# Patient Record
Sex: MALE | Race: ASIAN | NOT HISPANIC OR LATINO | URBAN - METROPOLITAN AREA
[De-identification: names, ages, dates, MRNs, and addresses within clinical notes are randomized per-mention and may not be internally consistent; named-entity substitution may affect disease eponyms.]

---

## 2023-02-03 ENCOUNTER — VIRTUAL VISIT (OUTPATIENT)
Dept: SLEEP MEDICINE | Facility: CLINIC | Age: 33
End: 2023-02-03
Payer: COMMERCIAL

## 2023-02-03 VITALS — HEIGHT: 62 IN | BODY MASS INDEX: 31.28 KG/M2 | WEIGHT: 170 LBS

## 2023-02-03 DIAGNOSIS — G47.21 CIRCADIAN RHYTHM SLEEP DISORDER, DELAYED SLEEP PHASE TYPE: ICD-10-CM

## 2023-02-03 DIAGNOSIS — E66.811 OBESITY (BMI 30.0-34.9): ICD-10-CM

## 2023-02-03 DIAGNOSIS — R06.81 WITNESSED EPISODE OF APNEA: ICD-10-CM

## 2023-02-03 DIAGNOSIS — R06.83 SNORING: ICD-10-CM

## 2023-02-03 DIAGNOSIS — G47.9 SLEEP DISTURBANCE: Primary | ICD-10-CM

## 2023-02-03 PROCEDURE — 99204 OFFICE O/P NEW MOD 45 MIN: CPT | Mod: 95 | Performed by: INTERNAL MEDICINE

## 2023-02-03 RX ORDER — PERPHENAZINE 4 MG
TABLET ORAL
COMMUNITY
Start: 2022-11-02

## 2023-02-03 RX ORDER — MULTIVITAMIN
1 TABLET ORAL DAILY
COMMUNITY
Start: 2022-11-02

## 2023-02-03 ASSESSMENT — SLEEP AND FATIGUE QUESTIONNAIRES
HOW LIKELY ARE YOU TO NOD OFF OR FALL ASLEEP WHILE SITTING INACTIVE IN A PUBLIC PLACE: MODERATE CHANCE OF DOZING
HOW LIKELY ARE YOU TO NOD OFF OR FALL ASLEEP WHEN YOU ARE A PASSENGER IN A CAR FOR AN HOUR WITHOUT A BREAK: WOULD NEVER DOZE
HOW LIKELY ARE YOU TO NOD OFF OR FALL ASLEEP IN A CAR, WHILE STOPPED FOR A FEW MINUTES IN TRAFFIC: WOULD NEVER DOZE
HOW LIKELY ARE YOU TO NOD OFF OR FALL ASLEEP WHILE LYING DOWN TO REST IN THE AFTERNOON WHEN CIRCUMSTANCES PERMIT: SLIGHT CHANCE OF DOZING
HOW LIKELY ARE YOU TO NOD OFF OR FALL ASLEEP WHILE SITTING QUIETLY AFTER LUNCH WITHOUT ALCOHOL: MODERATE CHANCE OF DOZING
HOW LIKELY ARE YOU TO NOD OFF OR FALL ASLEEP WHILE WATCHING TV: SLIGHT CHANCE OF DOZING
HOW LIKELY ARE YOU TO NOD OFF OR FALL ASLEEP WHILE SITTING AND READING: SLIGHT CHANCE OF DOZING
HOW LIKELY ARE YOU TO NOD OFF OR FALL ASLEEP WHILE SITTING AND TALKING TO SOMEONE: WOULD NEVER DOZE

## 2023-02-03 NOTE — PROGRESS NOTES
Yunior is a 32 year old who is being evaluated via a billable video visit.      How would you like to obtain your AVS? MyChart  If the video visit is dropped, the invitation should be resent by: Send to e-mail at: [email or web address]  Will anyone else be joining your video visit? Nury  Cherelle Main      Video-Visit Details    Type of service:  Video Visit     Originating Location (pt. Location): Home    Distant Location (provider location):  Off-site  Platform used for Video Visit: [facility]       Outpatient Sleep Medicine Consultation:      Name: Yunior Kovacs MRN# [id number]   Age: 32 year old YOB: 1990     Date of Consultation: February 3, 2023  Consultation is requested by: No referring provider defined for this encounter. No ref. provider found  Primary care provider: [facility], [facility]       Reason for Sleep Consult:     Yunior Kovacs is sent by No ref. provider found for a sleep consultation regarding snoring, obtaining evaluation for possible sleep apnea         Assessment and Plan:   Snoring, observed apneas during sleep, in the setting of obesity.  Possible obstructive sleep apnea  Diagnosis is suggested by history of snoring, observed apneas during sleep, neck size> 16 inches and male gender.  Recommended obtaining HST to evaluate for possible sleep apnea and that if HST is negative, we will consider obtaining in- lab sleep study and he was agreeable with the plan.  Obstructive sleep apnea and consequences of untreated sleep apnea were discussed.    We also discussed briefly the treatment options for TASIA.  He is interested in CPAP treatment.    Delayed sleep phase and concern for chronic insufficient sleep.  We discussed regularizing sleep schedule and arrived at a goal bedtime of 11 PM and  a goal wake up time of 7 AM. Recommended minimizing exposure to bright light a couple hours before bedtime and avoiding stimulating activities before bed and avoiding the use of electronics in bed.  "Recommended exposure to bright light for 30 to 60 minutes soon after awakening in the morning using a bright light box of 10,000 Lux intensity.      Obesity:  We discussed weight management with healthy diet, and exercise.    He was instructed to avoid alcohol consumption within 2 to 3 hours before bedtime and to  avoid naps during the day.    Patient was strongly advised to avoid driving, operating any heavy machinery or other hazardous situations while drowsy or sleepy.  Patient was counseled on the importance of driving while alert, to pull over if drowsy, or nap before getting into the vehicle if sleepy.      Plan is to follow up in approximately 2 weeks via video visit to review sleep study results     Summary Counseling:    Sleep Testing Reviewed  Obstructive Sleep Apnea Reviewed  Complications of Untreated Sleep Apnea Reviewed    Medical Decision-making:   Educational materials provided in instructions    CC: No ref. provider found   The above note was dictated using voice recognition software. Although reviewed after completion, some word and grammatical error may remain . Please contact the author for any clarifications.     \" Total time spent was 30 minutes  for this appointment on this date of service which include time spent before, during and after the visit for chart review, patient care, counseling and coordination of care including documentation.\"      Fernando Nelson MD  [facility]   Floor [address], [address]   [address] [place] [address]. [place] [address]   Appointments: [phone]          History of Present Illness:     Yunior Kovacs  Is a 32 year old male who presents to sleep clinic to obtain evaluation for possible sleep apnea.    Past Sleep Evaluations: None    Patient reports loud snoring, observed apneas during sleep, awakenings due to gasping for air and choking.  He also reports dry mouth and occasional morning " headaches.  During workdays, his bedtime is around 12 midnight. It takes 30 minutes for him to fall asleep sometimes it can take longer and he attributes it to active mind.  He uses his phone in bed and goes on the face book.  He wakes up once to use the bathroom during the night and does does not report difficulty falling back asleep after the awakening.  He wakes up with an alarm at 7 AM.  During non-work days, his bedtime is 12 midnight, sometimes later and he wakes up between 9-10 AM.  He reports waking up feeling rested most mornings. He reports occasional fatigue but denies excessive daytime sleepiness. He naps twice a week, for about an hour at the most and feels better after he wakes up from the nap.  He does not drive.  He does not possess a 's license.  He denies symptoms of restless legs syndrome.  He reports occasional sleep talking.  He denies sleepwalking, sleep eating or dream enactment behavior.  He reports rare episodes of sleep paralysis in the past. He denies cataplexy/hallucinations.    SCALES:    EPWORTH SLEEPINESS SCALE      [place] Sleepiness Scale ( HARRIS Davis  7729-3739<br>ESS - [place]/English - Final version - 21 Nov 07 - [facility] [place].) 2/3/2023   Sitting and reading Slight chance of dozing   Watching TV Slight chance of dozing   Sitting, inactive in a public place (e.g. a theatre or a meeting) Moderate chance of dozing   As a passenger in a car for an hour without a break Would never doze   Lying down to rest in the afternoon when circumstances permit Slight chance of dozing   Sitting and talking to someone Would never doze   Sitting quietly after a lunch without alcohol Moderate chance of dozing   In a car, while stopped for a few minutes in traffic Would never doze   [place] Score (MC) 7   [place] Score (Sleep) 7         INSOMNIA SEVERITY INDEX (MIK)      Insomnia Severity Index (MIK) 2/3/2023   Difficulty falling asleep 2   Difficulty staying asleep 2   Problems waking up  "too early 0   How SATISFIED/DISSATISFIED are you with your CURRENT sleep pattern? 2   How NOTICEABLE to others do you think your sleep problem is in terms of impairing the quality of your life? 4   How WORRIED/DISTRESSED are you about your current sleep problem? 4   To what extent do you consider your sleep problem to INTERFERE with your daily functioning (e.g. daytime fatigue, mood, ability to function at work/daily chores, concentration, memory, mood, etc.) CURRENTLY? 4   MIK Total Score 18       Guidelines for Scoring/Interpretation:  Total score categories:  0-7 = No clinically significant insomnia   8-14 = Subthreshold insomnia   15-21 = Clinical insomnia (moderate severity)  22-28 = Clinical insomnia (severe)  Used via courtesy of www.FanLibe[employer].va.gov with permission from Gino vAalos PhD., [facility]      STOP BANG Questionnaire (  2008, the American Society of Anesthesiologists, Inc. [employer] 2/3/2023   BMI Clinic: 31.09     GAD7    No flowsheet data found.    CAGE-AID    No flowsheet data found.    CAGE-AID reprinted with permission from the [place] Medical Journal, MICAELA Eduardo and STEPHANIE Ramos, \"Conjoint screening questionnaires for alcohol and drug abuse\" [place] Medical Journal 94: 135-140, 1995.      PATIENT HEALTH QUESTIONNAIRE-9 (PHQ - 9)    No flowsheet data found.    Developed by Farzana Dubose, Marlene Bhandari, Benjamin Lemons and colleagues, with an educational viv from [employer] No permission required to reproduce, translate, display or distribute.      Allergies:    No Known Allergies    Medications:    Current Outpatient Medications   Medication Sig Dispense Refill     ascorbic acid 1000 MG TABS tablet Take 1,000 mg by mouth       Collagen-Vitamin C-Biotin (COLLAGEN 1500/C) 500-50-0.8 MG CAPS        Multiple Vitamin (ONE-A-DAY ESSENTIAL) TABS Take 1 tablet by mouth daily       omeprazole (PRILOSEC) 20 MG DR capsule Take 20 mg by mouth   " "      Problem List:  There are no problems to display for this patient.       Past Medical/Surgical History:  No past medical history on file.  No past surgical history on file.    Social History:  He drinks 1 cup of coffee in the morning and sometimes 1 can of soda. Very rarely has caffeine within 6 hours before bed. He never smoked cigarettes. He  does not use illicit drugs . He drinks alcohol socially but does not use as  sleep aid and has had alcohol sometimes close to bedtime.    Social History     Socioeconomic History     Marital status: Patient Declined     Spouse name: Not on file     Number of children: Not on file     Years of education: Not on file     Highest education level: Not on file   Occupational History     Not on file   Tobacco Use     Smoking status: Never     Smokeless tobacco: Never   Substance and Sexual Activity     Alcohol use: Yes     Alcohol/week: 1.0 standard drink     Types: 1 Standard drinks or equivalent per week     Drug use: Never     Sexual activity: Not on file   Other Topics Concern     Not on file   Social History Narrative     Not on file     Social Determinants of Health     Financial Resource Strain: Not on file   Food Insecurity: Not on file   Transportation Needs: Not on file   Physical Activity: Not on file   Stress: Not on file   Social Connections: Not on file   Intimate Partner Violence: Not on file   Housing Stability: Not on file       Family History:  Dad snnikita  No family history on file.    Review of Systems:  A complete review of systems reviewed by me is negative with the exeption of what has been mentioned in the history of present illness.  SOB with activity, weight gain    Physical Examination:  Vitals: Ht 1.575 m (5' 2\")   Wt 77.1 kg (170 lb)   BMI 31.09 kg/m    BMI= Body mass index is 31.09 kg/m .    General: No apparent distress, appropriately groomed  Head: Normocephalic, atraumatic  Neck:Circumference: 17 inches  Chest: No cough, no audible wheezing, " able to talk in full sentences  Psych: coherent speech, normal rate and volume, able to articulate logical thoughts, able   to abstract reason, no tangential thoughts, no hallucinations   or delusions  His affect is normal  Neuro:  Mental status: Alert and  Oriented X 3  Speech: normal            Data: All pertinent previous laboratory data reviewed     No results for input(s): NA, POTASSIUM, CHLORIDE, CO2, ANIONGAP, GLC, BUN, CR, CASSANDRA in the last 76273 hours.    No results for input(s): WBC, RBC, HGB, HCT, MCV, MCH, MCHC, RDW, PLT in the last 00813 hours.    No results for input(s): PROTTOTAL, ALBUMIN, BILITOTAL, ALKPHOS, AST, ALT, BILIDIRECT in the last 91872 hours.    No results found for: TSH    No results found for: UAMP, UBARB, BENZODIAZEUR, UCANN, UCOC, OPIT, UPCP    No results found for: IRONSAT, SC39479, LAZARA    No results found for: PH, PHARTERIAL, PO2, [id number], SAT, PCO2, HCO3, BASEEXCESS, CARMELO, BEB      Echocardiology: No results found for this or any previous visit (from the past 4320 hour(s)).    Chest x-ray: No results found for this or any previous visit from the past 365 days.      Chest CT: No results found for this or any previous visit from the past 365 days.      PFT: Most Recent [facility] Pulmonary Function Testing    No results found for: [address]       Fernando Nelson MD 2/3/2023

## 2023-02-05 NOTE — PATIENT INSTRUCTIONS
"      MY TREATMENT INFORMATION FOR SLEEP APNEA-  Yunior Kovacs    DOCTOR : Fernando Nelson MD    Am I having a home sleep study?  --->Watch the video for the device you are using:    -/drop off device-   https://www.[employer].com/watch?v=yGGFBdELGhk    Frequently asked questions:  1. What is Obstructive Sleep Apnea (TASIA)? TASIA is the most common type of sleep apnea. Apnea means, \"without breath.\"  Apnea is most often caused by narrowing or collapse of the upper airway as muscles relax during sleep.   Almost everyone has occasional apneas. Most people with sleep apnea have had brief interruptions at night frequently for many years.  The severity of sleep apnea is related to how frequent and severe the events are.   2. What are the consequences of TASIA? Symptoms include: feeling sleepy during the day, snoring loudly, gasping or stopping of breathing, trouble sleeping, and occasionally morning headaches or heartburn at night.  Sleepiness can be serious and even increase the risk of falling asleep while driving. Other health consequences may include development of high blood pressure and other cardiovascular disease in persons who are susceptible. Untreated TASIA  can contribute to heart disease, stroke and diabetes.   3. What are the treatment options? In most situations, sleep apnea is a lifelong disease that must be managed with daily therapy. Medications are not effective for sleep apnea and surgery is generally not considered until other therapies have been tried. Your treatment is your choice . Continuous Positive Airway (CPAP) works right away and is the therapy that is effective in nearly everyone. An oral device to hold your jaw forward is usually the next most reliable option. Other options include postioning devices (to keep you off your back), weight loss, and surgery including a tongue pacing device. There is more detail about some of these options below.  4. Are my sleep studies " covered by insurance? Although we will request verification of coverage, we advise you also check in advance of the study to ensure there is coverage.    Important tips for those choosing CPAP and similar devices   Know your equipment:  CPAP is continuous positive airway pressure that prevents obstructive sleep apnea by keeping the throat from collapsing while you are sleeping. In most cases, the device is  smart  and can slowly self-adjusts if your throat collapses and keeps a record every day of how well you are treated-this information is available to you and your care team.  BPAP is bilevel positive airway pressure that keeps your throat open and also assists each breath with a pressure boost to maintain adequate breathing.  Special kinds of BPAP are used in patients who have inadequate breathing from lung or heart disease. In most cases, the device is  smart  and can slowly self-adjusts to assist breathing. Like CPAP, the device keeps a record of how well you are treated.  Your mask is your connection to the device. You get to choose what feels most comfortable and the staff will help to make sure if fits. Here: are some examples of the different masks that are available:       Key points to remember on your journey with sleep apnea:  Sleep study.  PAP devices often need to be adjusted during a sleep study to show that they are effective and adjusted right.  Good tips to remember: Try wearing just the mask during a quiet time during the day so your body adapts to wearing it. A humidifier is recommended for comfort in most cases to prevent drying of your nose and throat. Allergy medication from your provider may help you if you are having nasal congestion.  Getting settled-in. It takes more than one night for most of us to get used to wearing a mask. Try wearing just the mask during a quiet time during the day so your body adapts to wearing it. A humidifier is recommended for comfort in most cases. Our team will  work with you carefully on the first day and will be in contact within 4 days and again at 2 and 4 weeks for advice and remote device adjustments. Your therapy is evaluated by the device each day.   Use it every night. The more you are able to sleep naturally for 7-8 hours, the more likely you will have good sleep and to prevent health risks or symptoms from sleep apnea. Even if you use it 4 hours it helps. Occasionally all of us are unable to use a medical therapy, in sleep apnea, it is not dangerous to miss one night.   Communicate. Call our skilled team on the number provided on the first day if your visit for problems that make it difficult to wear the device. Over 2 out of 3 patients can learn to wear the device long-term with help from our team. Remember to call our team or your sleep providers if you are unable to wear the device as we may have other solutions for those who cannot adapt to mask CPAP therapy. It is recommended that you sleep your sleep provider within the first 3 months and yearly after that if you are not having problems.   Use it for your health. We encourage use of CPAP masks during daytime quiet periods to allow your face and brain to adapt to the sensation of CPAP so that it will be a more natural sensation to awaken to at night or during naps. This can be very useful during the first few weeks or months of adapting to CPAP though it does not help medically to wear CPAP during wakefulness and  should not be used as a strategy just to meet guidelines.  Take care of your equipment. Make sure you clean your mask and tubing using directions every day and that your filter and mask are replaced as recommended or if they are not working.     BESIDES CPAP, WHAT OTHER THERAPIES ARE THERE?    Positioning Device  Positioning devices are generally used when sleep apnea is mild and only occurs on your back.This example shows a pillow that straps around the waist. It may be appropriate for those whose  sleep study shows milder sleep apnea that occurs primarily when lying flat on one's back. Preliminary studies have shown benefit but effectiveness at home may need to be verified by a home sleep test. These devices are generally not covered by medical insurance.  Examples of devices that maintain sleeping on the back to prevent snoring and mild sleep apnea.    Belt type body positioner  http://Skipo.amazingtunes/    Electronic reminder  http://nightshifttherapy.com/            Oral Appliance  What is oral appliance therapy?  An oral appliance device fits on your teeth at night like a retainer used after having braces. The device is made by a specialized dentist and requires several visits over 1-2 months before a manufactured device is made to fit your teeth and is adjusted to prevent your sleep apnea. Once an oral device is working properly, snoring should be improved. A home sleep test may be recommended at that time if to determine whether the sleep apnea is adequately treated.       Some things to remember:  -Oral devices are often, but not always, covered by your medical insurance. Be sure to check with your insurance provider.   -If you are referred for oral therapy, you will be given a list of specialized dentists to consider or you may choose to visit the Web site of the American Academy of Dental Sleep Medicine  -Oral devices are less likely to work if you have severe sleep apnea or are extremely overweight.     More detailed information  An oral appliance is a small acrylic device that fits over the upper and lower teeth  (similar to a retainer or a mouth guard). This device slightly moves jaw forward, which moves the base of the tongue forward, opens the airway, improves breathing for effective treat snoring and obstructive sleep apnea in perhaps 7 out of 10 people .  The best working devices are custom-made by a dental device  after a mold is made of the teeth 1, 2, 3.  When is an oral appliance  indicated?  Oral appliance therapy is recommended as a first-line treatment for patients with primary snoring, mild sleep apnea, and for patients with moderate sleep apnea who prefer appliance therapy to use of CPAP4, 5. Severity of sleep apnea is determined by sleep testing and is based on the number of respiratory events per hour of sleep.   How successful is oral appliance therapy?  The success rate of oral appliance therapy in patients with mild sleep apnea is 75-80% while in patients with moderate sleep apnea it is 50-70%. The chance of success in patients with severe sleep apnea is 40-50%. The research also shows that oral appliances have a beneficial effect on the cardiovascular health of TASIA patients at the same magnitude as CPAP therapy7.  Oral appliances should be a second-line treatment in cases of severe sleep apnea, but if not completely successful then a combination therapy utilizing CPAP plus oral appliance therapy may be effective. Oral appliances tend to be effective in a broad range of patients although studies show that the patients who have the highest success are females, younger patients, those with milder disease, and less severe obesity. 3, 6.   Finding a dentist that practices dental sleep medicine  Specific training is available through the American Academy of Dental Sleep Medicine for dentists interested in working in the field of sleep. To find a dentist who is educated in the field of sleep and the use of oral appliances, near you, visit the Web site of the American Academy of Dental Sleep Medicine.    References  1. Brooks et al. Objectively measured vs self-reported compliance during oral appliance therapy for sleep-disordered breathing. Chest 2013; 144(5): 4586-5888.  2. Mariposa, et al. Objective measurement of compliance during oral appliance therapy for sleep-disordered breathing. Thorax 2013; 68(1): 91-96.  3. Rahul et al. Mandibular advancement devices in 620 men and  women with TASIA and snoring: tolerability and predictors of treatment success. Chest 2004; 125: 4172-6402.  4. Valdemar et al. Oral appliances for snoring and TASIA: a review. Sleep 2006; 29: 244-262.  5. Atul et al. Oral appliance treatment for TASIA: an update. J Clin Sleep Med 2014; 10(2): 215-227.  6. Sandra et al. Predictors of OSAH treatment outcome. J Dent Res 2007; 86: 6091-8568.      Weight Loss:    Weight loss is a long-term strategy that may improve sleep apnea in some patients.    Weight management is a personal decision and the decision should be based on your interest and the potential benefits.  If you are interested in exploring weight loss strategies, the following discussion covers the impact on weight loss on sleep apnea and the approaches that may be successful.    Being overweight does not necessarily mean you will have health consequences.  Those who have BMI over 35 or over 27 with existing medical conditions carries greater risk.   Weight loss decreases severity of sleep apnea in most people with obesity. For those with mild obesity who have developed snoring with weight gain, even 15-30 pound weight loss can improve and occasionally eliminate sleep apnea.  Structured and life-long dietary and health habits are necessary to lose weight and keep healthier weight levels.     Though there may be significant health benefits from weight loss, long-term weight loss is very difficult to achieve- studies show success with dietary management in less than 10% of people. In addition, substantial weight loss may require years of dietary control and may be difficult if patients have severe obesity. In these cases, surgical management may be considered.  Finally, older individuals who have tolerated obesity without health complications may be less likely to benefit from weight loss strategies.       Surgery:    Surgery for obstructive sleep apnea is considered generally only when other therapies fail  to work. Surgery may be discussed with you if you are having a difficult time tolerating CPAP and or when there is an abnormal structure that requires surgical correction.  Nose and throat surgeries often enlarge the airway to prevent collapse.  Most of these surgeries create pain for 1-2 weeks and up to half of the most common surgeries are not effective throughout life.  You should carefully discuss the benefits and drawbacks to surgery with your sleep provider and surgeon to determine if it is the best solution for you.   More information  Surgery for TASIA is directed at areas that are responsible for narrowing or complete obstruction of the airway during sleep.  There are a wide range of procedures available to enlarge and/or stabilize the airway to prevent blockage of breathing in the three major areas where it can occur: the palate, tongue, and nasal regions.  Successful surgical treatment depends on the accurate identification of the factors responsible for obstructive sleep apnea in each person.  A personalized approach is required because there is no single treatment that works well for everyone.  Because of anatomic variation, consultation with an examination by a sleep surgeon is a critical first step in determining what surgical options are best for each patient.  In some cases, examination during sedation may be recommended in order to guide the selection of procedures.  Patients will be counseled about risks and benefits as well as the typical recovery course after surgery. Surgery is typically not a cure for a person s TASIA.  However, surgery will often significantly improve one s TASIA severity (termed  success rate ).  Even in the absence of a cure, surgery will decrease the cardiovascular risk associated with OSA7; improve overall quality of life8 (sleepiness, functionality, sleep quality, etc).      Palate Procedures:  Patients with TASIA often have narrowing of their airway in the region of their  tonsils and uvula.  The goals of palate procedures are to widen the airway in this region as well as to help the tissues resist collapse.  Modern palate procedure techniques focus on tissue conservation and soft tissue rearrangement, rather than tissue removal.  Often the uvula is preserved in this procedure. Residual sleep apnea is common in patient after pharyngoplasty with an average reduction in sleep apnea events of 33%2.      Tongue Procedures:  ExamWhile patients are awake, the muscles that surround the throat are active and keep this region open for breathing. These muscles relax during sleep, allowing the tongue and other structures to collapse and block breathing.  There are several different tongue procedures available.  Selection of a tongue base procedure depends on characteristics seen on physical exam.  Generally, procedures are aimed at removing bulky tissues in this area or preventing the back of the tongue from falling back during sleep.  Success rates for tongue surgery range from 50-62%3.    Hypoglossal Nerve Stimulation:  Hypoglossal nerve stimulation has recently received approval from the [place] Food and Drug Administration for the treatment of obstructive sleep apnea.  This is based on research showing that the system was safe and effective in treating sleep apnea6.  Results showed that the median AHI score decreased 68%, from 29.3 to 9.0. This therapy uses an implant system that senses breathing patterns and delivers mild stimulation to airway muscles, which keeps the airway open during sleep.  The system consists of three fully implanted components: a small generator (similar in size to a pacemaker), a breathing sensor, and a stimulation lead.  Using a small handheld remote, a patient turns the therapy on before bed and off upon awakening.    Candidates for this device must be greater than 18 years of age, have moderate to severe TASIA (AHI between 15-65), BMI less than 35, have tried  CPAP/oral appliance for at least 8 weeks without success, and have appropriate upper airway anatomy (determined by a sleep endoscopy performed by Dr. Walker Ramsey).    Hypoglossal Nerve Stimulation Pathway:    The sleep surgeon s office will work with the patient through the insurance prior-authorization process (including communications and appeals).    Nasal Procedures:  Nasal obstruction can interfere with nasal breathing during the day and night.  Studies have shown that relief of nasal obstruction can improve the ability of some patients to tolerate positive airway pressure therapy for obstructive sleep apnea1.  Treatment options include medications such as nasal saline, topical corticosteroid and antihistamine sprays, and oral medications such as antihistamines or decongestants. Non-surgical treatments can include external nasal dilators for selected patients. If these are not successful by themselves, surgery can improve the nasal airway either alone or in combination with these other options.      Combination Procedures:  Combination of surgical procedures and other treatments may be recommended, particularly if patients have more than one area of narrowing or persistent positional disease.  The success rate of combination surgery ranges from 66-80%2,3.    References  Gerardo DANIELLE. The Role of the Nose in Snoring and Obstructive Sleep Apnoea: An Update.  Eur Arch Otorhinolaryngol. 2011; 268: 1365-73.   Caperic SM; Veronique JA; Adelso JR; Pallanch JF; Adore MB; Palak SG; Hernan CARRASCO. Surgical modifications of the upper airway for obstructive sleep apnea in adults: a systematic review and meta-analysis. SLEEP 2010;33(10):4798-8899. Lillian PEREZ. Hypopharyngeal surgery in obstructive sleep apnea: an evidence-based medicine review.  Arch Otolaryngol Head Neck Surg. 2006 Feb;132(2):206-13.  Guanaco YH1, Luan Y, Dereck CAROL. The efficacy of anatomically based multilevel surgery for obstructive sleep apnea. Otolaryngol Head  Neck Surg. 2003 Oct;129(4):327-35.  Lillian PEREZ, Goldberg A. Hypopharyngeal Surgery in Obstructive Sleep Apnea: An Evidence-Based Medicine Review. Arch Otolaryngol Head Neck Surg. 2006 Feb;132(2):206-13.  Emily CRUZ et al. Upper-Airway Stimulation for Obstructive Sleep Apnea.  N Engl J Med. 2014 Jan 9;370(2):139-49.  Ronni Y et al. Increased Incidence of Cardiovascular Disease in Middle-aged Men with Obstructive Sleep Apnea. Am J Respir Crit Care Med; 2002 166: 159-165  Madrid EM et al. Studying Life Effects and Effectiveness of Palatopharyngoplasty (SLEEP) study: Subjective Outcomes of Isolated Uvulopalatopharyngoplasty. Otolaryngol Head Neck Surg. 2011; 144: 623-631.        WHAT IF I ONLY HAVE SNORING?    Mandibular advancement devices, lateral sleep positioning, long-term weight loss and treatment of nasal allergies have been shown to improve snoring.  Exercising tongue muscles with a game ([employer]://Front Row.[employer] Forest/us/laurie/soundly-reduce-snoring/qz4970946627) or stimulating the tongue during the day with a device (https://doi.org/10.3390/jsd03962307) have improved snoring in some individuals.    Remember to Drive Safe... Drive Alive     Sleep health profoundly affects your health, mood, and your safety.  Thirty three percent of the population (one in three of us) is not getting enough sleep and many have a sleep disorder. Not getting enough sleep or having an untreated / undertreated sleep condition may make us sleepy without even knowing it. In fact, our driving could be dramatically impaired due to our sleep health. As your provider, here are some things I would like you to know about driving:     Here are some warning signs for impairment and dangerous drowsy driving:              -Having been awake more than 16 hours               -Looking tired               -Eyelid drooping              -Head nodding (it could be too late at this point)              -Driving for more than 30 minutes     Some things you could  do to make the driving safer if you are experiencing some drowsiness:              -Stop driving and rest              -Call for transportation              -Make sure your sleep disorder is adequately treated     Some things that have been shown NOT to work when experiencing drowsiness while driving:              -Turning on the radio              -Opening windows              -Eating any  distracting  /  entertaining  foods (e.g., sunflower seeds, candy, or any other)              -Talking on the phone      Your decision may not only impact your life, but also the life of others. Please, remember to drive safe for yourself and all of us.

## 2023-02-12 ENCOUNTER — HEALTH MAINTENANCE LETTER (OUTPATIENT)
Age: 33
End: 2023-02-12

## 2023-03-07 ASSESSMENT — SLEEP AND FATIGUE QUESTIONNAIRES
HOW LIKELY ARE YOU TO NOD OFF OR FALL ASLEEP WHEN YOU ARE A PASSENGER IN A CAR FOR AN HOUR WITHOUT A BREAK: WOULD NEVER DOZE
HOW LIKELY ARE YOU TO NOD OFF OR FALL ASLEEP WHILE SITTING QUIETLY AFTER LUNCH WITHOUT ALCOHOL: WOULD NEVER DOZE
HOW LIKELY ARE YOU TO NOD OFF OR FALL ASLEEP WHILE LYING DOWN TO REST IN THE AFTERNOON WHEN CIRCUMSTANCES PERMIT: SLIGHT CHANCE OF DOZING
HOW LIKELY ARE YOU TO NOD OFF OR FALL ASLEEP WHILE SITTING INACTIVE IN A PUBLIC PLACE: MODERATE CHANCE OF DOZING
HOW LIKELY ARE YOU TO NOD OFF OR FALL ASLEEP WHILE SITTING AND TALKING TO SOMEONE: WOULD NEVER DOZE
HOW LIKELY ARE YOU TO NOD OFF OR FALL ASLEEP WHILE SITTING AND READING: MODERATE CHANCE OF DOZING
HOW LIKELY ARE YOU TO NOD OFF OR FALL ASLEEP WHILE WATCHING TV: SLIGHT CHANCE OF DOZING
HOW LIKELY ARE YOU TO NOD OFF OR FALL ASLEEP IN A CAR, WHILE STOPPED FOR A FEW MINUTES IN TRAFFIC: WOULD NEVER DOZE

## 2023-03-08 ENCOUNTER — DOCUMENTATION ONLY (OUTPATIENT)
Dept: SLEEP MEDICINE | Facility: CLINIC | Age: 33
End: 2023-03-08

## 2023-03-08 ENCOUNTER — OFFICE VISIT (OUTPATIENT)
Dept: SLEEP MEDICINE | Facility: CLINIC | Age: 33
End: 2023-03-08
Attending: INTERNAL MEDICINE
Payer: COMMERCIAL

## 2023-03-08 DIAGNOSIS — G47.9 SLEEP DISTURBANCE: ICD-10-CM

## 2023-03-08 PROCEDURE — G0399 HOME SLEEP TEST/TYPE 3 PORTA: HCPCS | Performed by: INTERNAL MEDICINE

## 2023-03-09 ENCOUNTER — DOCUMENTATION ONLY (OUTPATIENT)
Dept: SLEEP MEDICINE | Facility: CLINIC | Age: 33
End: 2023-03-09
Payer: COMMERCIAL

## 2023-03-09 NOTE — PROGRESS NOTES
This HSAT was performed using a Noxturnal T3 device which recorded snore, sound, movement activity, body position, nasal pressure, oronasal thermal airflow, pulse, oximetry and both chest and abdominal respiratory effort. HSAT data was restricted to the time patient states they were in bed.     HSAT was scored using 1B 4% hypopnea rule.     HST AHI (Non-PAT): 16.9  Snoring was reported as loud.  Time with SpO2 below 89% was 11.8 minutes.   Overall signal quality was good     Pt will follow up with sleep provider to determine appropriate therapy.

## 2023-03-10 NOTE — PROCEDURES
"HOME SLEEP STUDY INTERPRETATION    Patient: Yunior Kovacs  MRN: [id number]  YOB: 1990  Study Date: 3/8/2023  Ordering Provider: Fernando Nelson MD    Indications for Home Study: Yunior Kovacs is a 32 year old male with symptoms suggestive of obstructive sleep apnea.    Estimated body mass index is 31.09 kg/m  as calculated from the following:    Height as of 2/3/23: 1.575 m (5' 2\").    Weight as of 2/3/23: 77.1 kg (170 lb).  Total score - [place]: 6 (3/7/2023 11:54 PM)  STOP-BAN/8    Data: A full night home sleep study was performed recording the standard physiologic parameters including body position, movement, sound, nasal pressure, thermal oral airflow, chest and abdominal movements with respiratory inductance plethysmography, and oxygen saturation by pulse oximetry. Pulse rate was estimated by oximetry recording. This study was considered adequate based on > 4 hours of quality oximetry and respiratory recording. As specified by the AASM Manual for the Scoring of Sleep and Associated events, version 2.3, Rule VIII.D 1B, 4% oxygen desaturation scoring for hypopneas is used as a standard of care on all home sleep apnea testing.    Analysis Time: 405.4 minutes    Respiration:   Sleep Associated Hypoxemia: sustained hypoxemia was present.  Average oxygen saturation was 94.9%.  Time with saturation less than or equal to 88% was 10.2 minutes. The lowest oxygen saturation was 67%.   Snoring: Snoring was present.  Respiratory events: The home study revealed a presence of 44 obstructive apneas and 4 mixed and central apneas. There were 66 hypopneas resulting in a combined apnea/hypopnea index [AHI] of 16.9 events per hour.  AHI was 35.5 per hour supine, n/a prone, n/a on left side, and 10.2 per hour on right side.   Pattern: Excluding events noted above,  there was intermittent periodicity in the breathing pattern with predominance of obstructive events.    Position: Percent of time " spent: supine -26.3%, prone -0%, on left -0%, on right -73.7%.    Heart Rate: By pulse oximetry, the average pulse rate was normal at 74 bpm.  The minimum pulse rate was 56 bpm and the maximum pulse rate was 112 bpm.    Assessment:     Moderate obstructive sleep apnea was present, pronounced during supine sleep position, with sleep associated hypoxemia.    Recommendations:    Consider auto-CPAP at 5-15 cmH2O or positional therapy in conjunction with oral appliance via referral to dentistry.    Suggest optimizing sleep hygiene and avoiding sleep deprivation.    Weight management.      Diagnosis Code(s): Obstructive Sleep Apnea G47.33, Hypoxemia G47.36, Snoring R06.83    Fernando Nelson MD, March 9, 2023   Diplomate, American Board of Internal Medicine, Sleep Medicine

## 2023-03-15 NOTE — NURSING NOTE
HST pick-up, HST drop-off, and follow-up appointment with provider have all been scheduled.  Bailey Gordillo, CMA

## 2023-03-28 ENCOUNTER — VIRTUAL VISIT (OUTPATIENT)
Dept: SLEEP MEDICINE | Facility: CLINIC | Age: 33
End: 2023-03-28
Payer: COMMERCIAL

## 2023-03-28 VITALS — WEIGHT: 170 LBS | BODY MASS INDEX: 31.28 KG/M2 | HEIGHT: 62 IN

## 2023-03-28 DIAGNOSIS — E66.9 OBESITY WITH SLEEP APNEA: ICD-10-CM

## 2023-03-28 DIAGNOSIS — G47.33 OSA (OBSTRUCTIVE SLEEP APNEA): Primary | ICD-10-CM

## 2023-03-28 DIAGNOSIS — G47.30 OBESITY WITH SLEEP APNEA: ICD-10-CM

## 2023-03-28 PROCEDURE — 99214 OFFICE O/P EST MOD 30 MIN: CPT | Mod: VID | Performed by: INTERNAL MEDICINE

## 2023-03-28 ASSESSMENT — SLEEP AND FATIGUE QUESTIONNAIRES
HOW LIKELY ARE YOU TO NOD OFF OR FALL ASLEEP WHILE LYING DOWN TO REST IN THE AFTERNOON WHEN CIRCUMSTANCES PERMIT: SLIGHT CHANCE OF DOZING
HOW LIKELY ARE YOU TO NOD OFF OR FALL ASLEEP WHILE SITTING AND TALKING TO SOMEONE: WOULD NEVER DOZE
HOW LIKELY ARE YOU TO NOD OFF OR FALL ASLEEP WHILE SITTING INACTIVE IN A PUBLIC PLACE: SLIGHT CHANCE OF DOZING
HOW LIKELY ARE YOU TO NOD OFF OR FALL ASLEEP WHILE WATCHING TV: SLIGHT CHANCE OF DOZING
HOW LIKELY ARE YOU TO NOD OFF OR FALL ASLEEP WHILE SITTING QUIETLY AFTER LUNCH WITHOUT ALCOHOL: SLIGHT CHANCE OF DOZING
HOW LIKELY ARE YOU TO NOD OFF OR FALL ASLEEP WHILE SITTING AND READING: MODERATE CHANCE OF DOZING
HOW LIKELY ARE YOU TO NOD OFF OR FALL ASLEEP WHEN YOU ARE A PASSENGER IN A CAR FOR AN HOUR WITHOUT A BREAK: WOULD NEVER DOZE
HOW LIKELY ARE YOU TO NOD OFF OR FALL ASLEEP IN A CAR, WHILE STOPPED FOR A FEW MINUTES IN TRAFFIC: WOULD NEVER DOZE

## 2023-03-28 NOTE — NURSING NOTE
Flu vaccine 11/2/2022    Is the patient currently in the state of [place]? YES    Visit mode:VIDEO    If the visit is dropped, the patient can be reconnected by: VIDEO VISIT: Text to cell phone: [phone]    Will anyone else be joining the visit? NO      How would you like to obtain your AVS? MyChart    Are changes needed to the allergy or medication list? NO    Reason for visit: Follow Up     Naheed Mark

## 2023-03-28 NOTE — PROGRESS NOTES
Virtual Visit Details    Type of service:  Video Visit     Originating Location (pt. Location): Home    Distant Location (provider location):  Off-site  Platform used for Video Visit: [facility]  Sleep clinic follow-up visit note      Date on this visit:  March 28, 2023     Chief complaint: Review results of recently obtained home sleep  study      Yunoir Kovacs is a 32 year old male with symptoms suggestive of obstructive sleep apnea.  Home sleep study was obtained on March 8, 2023 to evaluate for TASIA.  Today's video visit is scheduled to review the test results and to discuss plan of care.    Home sleep study report:   Study Date: 3/8/2023     Analysis Time: 405.4 minutes     Respiration:   Sleep Associated Hypoxemia: sustained hypoxemia was present.  Average oxygen saturation was 94.9%.  Time with saturation less than or equal to 88% was 10.2 minutes. The lowest oxygen saturation was 67%.   Snoring: Snoring was present.  Respiratory events: The home study revealed a presence of 44 obstructive apneas and 4 mixed and central apneas. There were 66 hypopneas resulting in a combined apnea/hypopnea index [AHI] of 16.9 events per hour.  AHI was 35.5 per hour supine, n/a prone, n/a on left side, and 10.2 per hour on right side.   Pattern: Excluding events noted above,  there was intermittent periodicity in the breathing pattern with predominance of obstructive events.     Position: Percent of time spent: supine -26.3%, prone -0%, on left -0%, on right -73.7%.     Heart Rate: By pulse oximetry, the average pulse rate was normal at 74 bpm.  The minimum pulse rate was 56 bpm and the maximum pulse rate was 112 bpm.     Assessment:     Moderate obstructive sleep apnea was present, pronounced during supine sleep position, with sleep associated hypoxemia.     Test results were discussed with the patient in detail.    Past medical/surgical history, family history, social history, medications and  "allergies were reviewed.            Physical Examination:      General: Pleasant. Cooperative. In no apparent distress.  Pulmonary: Able to speak in full sentences easily. No cough or wheeze.   Neurologic: Alert, oriented x3.  Psychiatric: Mood euthymic. Affect congruent with full range and intensity.             Assessment and Plan:    Moderate obstructive sleep apnea, pronounced during supine sleep position with sleep associated hypoxemia:  The results of the home sleep study were discussed with the patient in detail.  We discussed the various treatment options for TASIA, patient was interested in CPAP therapy.  DME orders were generated for auto CPAP 5 to 15 cm water.  After obtaining the equipment, he was recommended to use the CPAP regularly during sleep and instructed to get back to us if any concerns including mask issues or other interface concerns or problems tolerating the pressure settings.  We discussed a mask exchange policy and the compliance goals.  He will be followed through sleep therapy management program.  Plan is to follow-up via video visit in approximately 6 to 8 weeks after initiating CPAP therapy to review the compliance measures.  At the follow-up visit we will consider ordering an overnight oximetry with the CPAP to check for resolution of hypoxemia that was observed during the recent home sleep study.     Obesity: We discussed weight management with diet and exercise.     Patient was strongly advised to avoid driving, operating any heavy machinery or other hazardous situations while drowsy or sleepy.  Patient was counseled on the importance of driving while alert, to pull over if drowsy, or nap before getting into the vehicle if sleepy.     The above note was dictated using voice recognition software. Although reviewed after completion, some word and grammatical error may remain . Please contact the author for any clarifications.      \" Total time spent was 30 minutes  for this appointment " "on this date of service which include time spent before, during and after the visit for chart review, patient care, counseling and coordination of care including documentation.\"       Fernando Nelson MD  [facility]   [address]oor [address], [address]   [address] [place] [address]. [place] [address]   Appointments: [phone]  "

## 2023-05-01 ENCOUNTER — DOCUMENTATION ONLY (OUTPATIENT)
Dept: SLEEP MEDICINE | Facility: CLINIC | Age: 33
End: 2023-05-01

## 2023-05-01 ENCOUNTER — DOCUMENTATION ONLY (OUTPATIENT)
Dept: SLEEP MEDICINE | Facility: CLINIC | Age: 33
End: 2023-05-01
Payer: COMMERCIAL

## 2023-05-01 DIAGNOSIS — G47.33 OBSTRUCTIVE SLEEP APNEA (ADULT) (PEDIATRIC): Primary | ICD-10-CM

## 2023-05-01 NOTE — PROGRESS NOTES
Patient was offered choice of vendor and chose [facility].  Patient Yunior Kovacs was set up at [facility] on May 1, 2023. Patient received a Resmed Airsense 11 Pressures were set at 5-15 cm H2O.   Patient s ramp is 4 cm H2O for Auto and FLEX/EPR is EPR, 1.  Patient received a Resmed Mask name: AirFit F20 Full Face mask size Medium, heated tubing and heated humidifier.  Patient has the following compliance requirements: none.  CHRISSIE COELHO

## 2023-05-04 ENCOUNTER — DOCUMENTATION ONLY (OUTPATIENT)
Dept: SLEEP MEDICINE | Facility: CLINIC | Age: 33
End: 2023-05-04
Payer: COMMERCIAL

## 2023-05-04 NOTE — PROGRESS NOTES
3 day Sleep therapy management telephone visit    Diagnostic AHI:  16.9  HST    Confirmed with patient at time of call- Yes Patient is still interested in STM service       Subjective measures:  Patient adjusting to using CPAP.  He thinks he is feeling better.  He thinks he is getting used to CPAP.          Objective data     Order Settings for PAP  CPAP min     CPAP max              Device settings from machine CPAP min 5.0     CPAP max 15.0           EPR Setting ONE    RESMED soft response  ON     Assessment: Nighty usage most nights over four hours      Action plan: Patient to have 14 day STM visit. Patient has a follow up visit scheduled:   no    Replacement device: No  STM ordered by provider: Yes     Total time spent on accessing and  interpreting remote patient PAP therapy data  10 minutes    Total time spent counseling, coaching  and reviewing PAP therapy data with patient  5 minutes    [id number] no

## 2023-05-17 ENCOUNTER — DOCUMENTATION ONLY (OUTPATIENT)
Dept: SLEEP MEDICINE | Facility: CLINIC | Age: 33
End: 2023-05-17
Payer: COMMERCIAL

## 2023-05-17 DIAGNOSIS — G47.33 OSA ON CPAP: Primary | ICD-10-CM

## 2023-05-17 NOTE — PROGRESS NOTES
14  DAY STM VISIT    Diagnostic AHI:  16.9 HST    Subjective measures: Patient reports having a hard time with his CPAP pressure increasing at night time. He says it increases and causes the mask to leak. He feels it makes him have difficulty falling back asleep, so he removes it. He has no difficulty tolerating it at the beginning of the night. Will put in for pressure change 5-9 cm h2O and continue to monitor. Extended Ramp time as patient felt that restarting the machine did not help enough. Shared his apnea is very well controlled when he is using the mask. Recommended patient make sure his mask is fitting well. He says he may want to consider a nasal mask. His EPR was turned off, will turn on to 2. Will put in for pressure change and follow up with pt in 1 week.     Assessment: Pt not meeting objective benchmarks for compliance  Patient failing following subjective benchmarks: pressure issues and leak issues     Action plan: pt to have 30 day STM visit.      Device type: Auto-CPAP    PAP settings:  DEVICE TYPE CPAP MIN CPAP MAX 95TH % PRESSURE EPR MASK DISPENSED   Auto-CPAP    5.0 cm  H20 15.0 cm  H20 9 cm  H20  ONE Full Face Mask     Mask type:  Full Face Mask    Objective measures: 14 day rolling measures   COMPLIANCE LEAK AHI AVERAGE USE IN MINUTES   14 % 21.84 0.85 144   GOAL >70% GOAL < 24 LPM GOAL <5 GOAL >240      Patient has the following upcoming sleep appts:      Total time spent on accessing and interpreting remote patient PAP therapy data  10 minutes    Total time spent counseling, coaching  and reviewing PAP therapy data with patient 9 minutes    [id number]  [id number]  no (3 day STM)

## 2023-05-24 ENCOUNTER — DOCUMENTATION ONLY (OUTPATIENT)
Dept: SLEEP MEDICINE | Facility: CLINIC | Age: 33
End: 2023-05-24
Payer: COMMERCIAL

## 2023-06-02 ENCOUNTER — DOCUMENTATION ONLY (OUTPATIENT)
Dept: SLEEP MEDICINE | Facility: CLINIC | Age: 33
End: 2023-06-02
Payer: COMMERCIAL

## 2023-06-02 NOTE — PROGRESS NOTES
30 DAY STM VISIT    Diagnostic AHI: 16.9  HST    Subjective measures:   Spoke with patient he wanted to wait a few weeks to see how he feels to see if the pressure change is effective.     Assessment: Pt not meeting objective benchmarks for compliance  Patient failing following subjective benchmarks: pressure issues  Action plan: 2 week STM recheck appt scheduled  Patient has not scheduled a follow up visit.   Device type: Auto-CPAP  PAP settings: CPAP min 5.0 cm  H20     CPAP max 9.0 cm  H20    95th% pressure 8.8 cm  H20      RESMED EPR level Setting: TWO    RESMED Soft response setting:  ON  Mask type:  Full Face Mask  Objective measures: 14 day rolling measures      Compliance  28 %      Leak  7.44 lpm  last  upload      AHI 0.86   last  upload      Average number of minutes 182      Objective measure goal  Compliance   Goal >70%  Leak   Goal < 24 lpm  AHI  Goal < 5  Usage  Goal >240        Total time spent on accessing and interpreting remote patient PAP therapy data  10 minutes    Total time spent counseling, coaching  and reviewing PAP therapy data with patient  3 minutes     [id number] this call  [id number] no  at 3 or 14 day [facility]

## 2023-06-21 ENCOUNTER — DOCUMENTATION ONLY (OUTPATIENT)
Dept: SLEEP MEDICINE | Facility: CLINIC | Age: 33
End: 2023-06-21
Payer: COMMERCIAL

## 2023-12-31 ENCOUNTER — HEALTH MAINTENANCE LETTER (OUTPATIENT)
Age: 33
End: 2023-12-31

## 2025-01-19 ENCOUNTER — HEALTH MAINTENANCE LETTER (OUTPATIENT)
Age: 35
End: 2025-01-19